# Patient Record
Sex: FEMALE | URBAN - METROPOLITAN AREA
[De-identification: names, ages, dates, MRNs, and addresses within clinical notes are randomized per-mention and may not be internally consistent; named-entity substitution may affect disease eponyms.]

---

## 2020-12-03 ENCOUNTER — APPOINTMENT (RX ONLY)
Dept: URBAN - METROPOLITAN AREA CLINIC 55 | Facility: CLINIC | Age: 55
Setting detail: DERMATOLOGY
End: 2020-12-03

## 2020-12-03 DIAGNOSIS — Z41.9 ENCOUNTER FOR PROCEDURE FOR PURPOSES OTHER THAN REMEDYING HEALTH STATE, UNSPECIFIED: ICD-10-CM

## 2020-12-03 PROCEDURE — ? BOTOX

## 2020-12-03 NOTE — PROCEDURE: BOTOX
Show Additional Area 6: Yes
R Brow Units: 0
Show Right And Left Periorbital Units: No
Consent: Written consent obtained. Risks include but not limited to lid/brow ptosis, bruising, swelling, diplopia, temporary effect, incomplete chemical denervation.
Additional Area 1 Location: Lateral Brow
Glabellar Complex Units: 12
Detail Level: Detailed
Additional Area 2 Location: Upper lip
Forehead Units: 2
Lot #: [id number]
Dilution (U/0.1 Cc): 4
Expiration Date (Month Year): 9/30/2023
Post-Care Instructions: Patient instructed to not lie down for 4 hours and limit physical activity for 24 hours.

## 2022-03-11 ENCOUNTER — APPOINTMENT (RX ONLY)
Dept: URBAN - METROPOLITAN AREA CLINIC 55 | Facility: CLINIC | Age: 57
Setting detail: DERMATOLOGY
End: 2022-03-11

## 2022-03-11 DIAGNOSIS — Z41.9 ENCOUNTER FOR PROCEDURE FOR PURPOSES OTHER THAN REMEDYING HEALTH STATE, UNSPECIFIED: ICD-10-CM

## 2022-03-11 PROCEDURE — ? BOTOX

## 2022-03-11 PROCEDURE — ? COSMETIC CONSULTATION: GENERAL

## 2022-03-11 NOTE — PROCEDURE: BOTOX
Additional Area 3 Units: 0
Show Right And Left Pupillary Line Units: No
Expiration Date (Month Year): 4/2024
Additional Area 4 Location: upper cutaneous lip
Show Additional Area 6: Yes
Lot #: [id number]
Glabellar Complex Units: 12
Post-Care Instructions: Patient instructed to not lie down for 4 hours and limit physical activity for 24 hours.
Consent: Written consent obtained. Risks include but not limited to lid/brow ptosis, bruising, swelling, diplopia, temporary effect, incomplete chemical denervation.
Detail Level: Detailed
Additional Area 1 Location: Lateral Brows
Dilution (U/0.1 Cc): 4
Additional Area 2 Location: Upper lip
Additional Area 3 Location: Chin

## 2022-04-06 ENCOUNTER — RX ONLY (OUTPATIENT)
Age: 57
Setting detail: RX ONLY
End: 2022-04-06

## 2022-10-07 ENCOUNTER — APPOINTMENT (RX ONLY)
Dept: URBAN - METROPOLITAN AREA CLINIC 55 | Facility: CLINIC | Age: 57
Setting detail: DERMATOLOGY
End: 2022-10-07

## 2022-10-07 DIAGNOSIS — Z41.9 ENCOUNTER FOR PROCEDURE FOR PURPOSES OTHER THAN REMEDYING HEALTH STATE, UNSPECIFIED: ICD-10-CM

## 2022-10-07 PROCEDURE — ? INVENTORY

## 2022-10-07 PROCEDURE — ? BOTOX

## 2022-10-07 NOTE — PROCEDURE: BOTOX
Show Nasal Units: Yes
[facility] Units: 0
Show Right And Left Brow Units: No
Consent: Verbal consent obtained. Risks include but not limited to lid/brow ptosis, bruising, swelling, diplopia, temporary effect, incomplete chemical denervation.
Additional Area 1 Location: upper lip
Detail Level: Detailed
Glabellar Complex Units: 12
Dilution (U/0.1 Cc): 4
Post-Care Instructions: Patient instructed to not lie down for 4 hours and limit physical activity for 24 hours.
Additional Area 2 Location: chin
Show Inventory Tab: Show

## 2023-01-20 ENCOUNTER — APPOINTMENT (RX ONLY)
Dept: URBAN - METROPOLITAN AREA CLINIC 55 | Facility: CLINIC | Age: 58
Setting detail: DERMATOLOGY
End: 2023-01-20

## 2023-01-20 DIAGNOSIS — Z41.9 ENCOUNTER FOR PROCEDURE FOR PURPOSES OTHER THAN REMEDYING HEALTH STATE, UNSPECIFIED: ICD-10-CM

## 2023-01-20 PROCEDURE — ? BOTOX

## 2023-01-20 NOTE — PROCEDURE: BOTOX
Show Levator Superior Units: Yes
Left Periorbital Skin Units: 0
Detail Level: Detailed
Show Lcl Units: No
Dilution (U/0.1 Cc): 4
Show Inventory Tab: Show
Glabellar Complex Units: 20
Additional Area 1 Location: upper lip
Consent: Verbal consent obtained. Risks include but not limited to lid/brow ptosis, bruising, swelling, diplopia, temporary effect, incomplete chemical denervation.
Additional Area 2 Location: chin
Post-Care Instructions: Patient instructed to not lie down for 4 hours and limit physical activity for 24 hours.

## 2024-04-04 ENCOUNTER — APPOINTMENT (RX ONLY)
Dept: URBAN - METROPOLITAN AREA CLINIC 55 | Facility: CLINIC | Age: 59
Setting detail: DERMATOLOGY
End: 2024-04-04

## 2024-04-04 DIAGNOSIS — Z41.9 ENCOUNTER FOR PROCEDURE FOR PURPOSES OTHER THAN REMEDYING HEALTH STATE, UNSPECIFIED: ICD-10-CM

## 2024-04-04 PROCEDURE — ? COSMETIC CONSULTATION: GENERAL

## 2024-04-04 PROCEDURE — ? INVENTORY

## 2024-04-04 PROCEDURE — ? BOTOX

## 2024-04-04 PROCEDURE — ? ICON IPL

## 2024-04-04 ASSESSMENT — LOCATION SIMPLE DESCRIPTION DERM: LOCATION SIMPLE: LEFT CHEEK

## 2024-04-04 ASSESSMENT — LOCATION ZONE DERM: LOCATION ZONE: FACE

## 2024-04-04 ASSESSMENT — LOCATION DETAILED DESCRIPTION DERM: LOCATION DETAILED: LEFT CENTRAL MALAR CHEEK

## 2024-04-04 NOTE — PROCEDURE: BOTOX
[place] Units: 0
Show Ucl Units: No
Consent: Verbal consent obtained. Risks include but not limited to lid/brow ptosis, bruising, swelling, diplopia, temporary effect, incomplete chemical denervation.
Show Additional Area 4: Yes
Dilution (U/0.1 Cc): 4
Incrementing Botox Units: By 0.5 Units
Additional Area 1 Location: upper lip
Post-Care Instructions: Patient instructed to not lie down for 4 hours and limit physical activity for 24 hours.
Glabellar Complex Units: 20
Detail Level: Detailed
Additional Area 3 Location: lower lip
Additional Area 2 Location: chin
Show Inventory Tab: Show

## 2024-04-04 NOTE — PROCEDURE: ICON IPL
Contact: Light
Consent obtained, risks reviewed.
Detail Level: Zone
Pulse Duration (Optional- Include Units): 20 ms
Pulse Duration (Optional- Include Units): 15 ms
Passes: 1
Anesthesia Volume In Cc: 0
Pre-Procedure Text: After consent was obtained, the treatment areas were cleansed and treated using the parameters mentioned above.
External Cooling Fan Speed: 5
Energy (Optional- Include Units): 44 J/cm2
Energy (Optional- Include Units): 32 J/cm2
Post-Care Instructions: I reviewed with the patient in detail post-care instructions. Patient should avoid sun exposure and to wear sun protection until treated areas are fully healed.